# Patient Record
Sex: MALE | Race: ASIAN | NOT HISPANIC OR LATINO | Employment: FULL TIME | URBAN - METROPOLITAN AREA
[De-identification: names, ages, dates, MRNs, and addresses within clinical notes are randomized per-mention and may not be internally consistent; named-entity substitution may affect disease eponyms.]

---

## 2017-04-04 ENCOUNTER — ALLSCRIPTS OFFICE VISIT (OUTPATIENT)
Dept: OTHER | Facility: OTHER | Age: 52
End: 2017-04-04

## 2017-05-07 ENCOUNTER — ANESTHESIA EVENT (OUTPATIENT)
Dept: GASTROENTEROLOGY | Facility: HOSPITAL | Age: 52
End: 2017-05-07
Payer: COMMERCIAL

## 2017-05-08 ENCOUNTER — GENERIC CONVERSION - ENCOUNTER (OUTPATIENT)
Dept: OTHER | Facility: OTHER | Age: 52
End: 2017-05-08

## 2017-05-08 ENCOUNTER — HOSPITAL ENCOUNTER (OUTPATIENT)
Facility: HOSPITAL | Age: 52
Setting detail: OUTPATIENT SURGERY
Discharge: HOME | End: 2017-05-08
Attending: INTERNAL MEDICINE | Admitting: INTERNAL MEDICINE
Payer: COMMERCIAL

## 2017-05-08 ENCOUNTER — ANESTHESIA (OUTPATIENT)
Dept: GASTROENTEROLOGY | Facility: HOSPITAL | Age: 52
End: 2017-05-08
Payer: COMMERCIAL

## 2017-05-08 VITALS
WEIGHT: 158.73 LBS | RESPIRATION RATE: 16 BRPM | TEMPERATURE: 97.1 F | DIASTOLIC BLOOD PRESSURE: 69 MMHG | SYSTOLIC BLOOD PRESSURE: 103 MMHG | OXYGEN SATURATION: 100 % | HEIGHT: 65 IN | BODY MASS INDEX: 26.45 KG/M2 | HEART RATE: 70 BPM

## 2017-05-08 DIAGNOSIS — Z12.11 ENCOUNTER FOR SCREENING FOR MALIGNANT NEOPLASM OF COLON: ICD-10-CM

## 2017-05-08 DIAGNOSIS — K21.9 GERD (GASTROESOPHAGEAL REFLUX DISEASE): ICD-10-CM

## 2017-05-08 DIAGNOSIS — R10.13 EPIGASTRIC PAIN: ICD-10-CM

## 2017-05-08 PROCEDURE — 88305 TISSUE EXAM BY PATHOLOGIST: CPT | Performed by: INTERNAL MEDICINE

## 2017-05-08 PROCEDURE — 88342 IMHCHEM/IMCYTCHM 1ST ANTB: CPT | Performed by: INTERNAL MEDICINE

## 2017-05-08 RX ORDER — GLYCOPYRROLATE 0.2 MG/ML
INJECTION INTRAMUSCULAR; INTRAVENOUS AS NEEDED
Status: DISCONTINUED | OUTPATIENT
Start: 2017-05-08 | End: 2017-05-08 | Stop reason: SURG

## 2017-05-08 RX ORDER — LIDOCAINE HYDROCHLORIDE 10 MG/ML
INJECTION, SOLUTION INFILTRATION; PERINEURAL AS NEEDED
Status: DISCONTINUED | OUTPATIENT
Start: 2017-05-08 | End: 2017-05-08 | Stop reason: SURG

## 2017-05-08 RX ORDER — PANTOPRAZOLE SODIUM 40 MG/1
40 TABLET, DELAYED RELEASE ORAL
Status: DISCONTINUED | OUTPATIENT
Start: 2017-05-08 | End: 2017-05-08 | Stop reason: HOSPADM

## 2017-05-08 RX ORDER — SODIUM CHLORIDE, SODIUM LACTATE, POTASSIUM CHLORIDE, CALCIUM CHLORIDE 600; 310; 30; 20 MG/100ML; MG/100ML; MG/100ML; MG/100ML
100 INJECTION, SOLUTION INTRAVENOUS CONTINUOUS
Status: DISCONTINUED | OUTPATIENT
Start: 2017-05-08 | End: 2017-05-08 | Stop reason: HOSPADM

## 2017-05-08 RX ORDER — PROPOFOL 10 MG/ML
INJECTION, EMULSION INTRAVENOUS AS NEEDED
Status: DISCONTINUED | OUTPATIENT
Start: 2017-05-08 | End: 2017-05-08 | Stop reason: SURG

## 2017-05-08 RX ORDER — DEXLANSOPRAZOLE 60 MG/1
60 CAPSULE, DELAYED RELEASE ORAL DAILY
COMMUNITY
End: 2017-05-08 | Stop reason: HOSPADM

## 2017-05-08 RX ORDER — PROPOFOL 10 MG/ML
INJECTION, EMULSION INTRAVENOUS CONTINUOUS PRN
Status: DISCONTINUED | OUTPATIENT
Start: 2017-05-08 | End: 2017-05-08 | Stop reason: SURG

## 2017-05-08 RX ADMIN — PROPOFOL 100 MG: 10 INJECTION, EMULSION INTRAVENOUS at 08:53

## 2017-05-08 RX ADMIN — PROPOFOL 50 MG: 10 INJECTION, EMULSION INTRAVENOUS at 08:54

## 2017-05-08 RX ADMIN — PROPOFOL 120 MCG/KG/MIN: 10 INJECTION, EMULSION INTRAVENOUS at 08:55

## 2017-05-08 RX ADMIN — SODIUM CHLORIDE, SODIUM LACTATE, POTASSIUM CHLORIDE, AND CALCIUM CHLORIDE 100 ML/HR: .6; .31; .03; .02 INJECTION, SOLUTION INTRAVENOUS at 08:08

## 2017-05-08 RX ADMIN — GLYCOPYRROLATE 0.2 MG: 0.2 INJECTION INTRAMUSCULAR; INTRAVENOUS at 09:05

## 2017-05-08 RX ADMIN — LIDOCAINE HYDROCHLORIDE 25 MG: 10 INJECTION, SOLUTION INFILTRATION; PERINEURAL at 08:52

## 2017-05-08 RX ADMIN — PROPOFOL 20 MG: 10 INJECTION, EMULSION INTRAVENOUS at 09:02

## 2017-05-15 ENCOUNTER — GENERIC CONVERSION - ENCOUNTER (OUTPATIENT)
Dept: OTHER | Facility: OTHER | Age: 52
End: 2017-05-15

## 2018-01-11 NOTE — RESULT NOTES
Discussion/Summary    Gastric biopsies are benign  Patient to continue [facility]  Called for patient and left a message    PT IS AWARE  CS         Verified Results  (1) TISSUE EXAM [id number] 08:56AM Lliian Master     Test Name Result Flag Reference   LAB AP CASE REPORT (Report)     Surgical Pathology Report             Case: [id number]                   Authorizing Provider: Eric Lam MD     Collected:      05/08/2017 0856        Ordering Location:   [facility]    Received:      05/08/2017 [address] [place] [address] Endoscopy                               Pathologist:      Crystal Woody MD                                Specimen:  Stomach, Cold biopsy gastric body r/o H  pylori   LAB AP FINAL DIAGNOSIS (Report)     A  Stomach, Cold biopsy gastric body r/o H  Pylori biopsy:   -Benign gastric-body type mucosa with minimal chronic superficial inactive   gastritis   -No evidence of Paneth cell metaplasia or atrophic gastritis   -No evidence of dysplasia or malignancy   -No H Pylori organisms identified on immunohistochemical stained slide  Control reacted appropriately  Electronically signed by Crystal Woody MD on 5/10/2017 at 10:20 AM   LAB AP SURGICAL ADDITIONAL INFORMATION (Report)     These tests were developed and their performance characteristics   determined by [facility] or [facility]  They may not be cleared or approved by the [place]  Food and   Drug Administration  The FDA has determined that such clearance or   approval is not necessary  These tests are used for clinical purposes  They should not be regarded as investigational or for research  This   laboratory has been approved by [place], designated as a high-complexity   laboratory and is qualified to perform these tests  LAB AP GROSS DESCRIPTION (Report)     A   The specimen is received in formalin, labeled with the patient's name   and hospital number, and is designated gastric body biopsy  The   specimen consists of 5 tan-pink soft tissue fragments ranging from 0 2 cm   to 0 5 cm in greatest dimension  Entirely submitted  One cassette  Note: The estimated total formalin fixation time based upon information   provided by the submitting clinician and the standard processing schedule   is 17 5 hours    MAS   LAB AP CLINICAL INFORMATION R/O H PYLORI     R/O H PYLORI

## 2018-01-13 VITALS
SYSTOLIC BLOOD PRESSURE: 122 MMHG | OXYGEN SATURATION: 98 % | HEART RATE: 61 BPM | RESPIRATION RATE: 16 BRPM | HEIGHT: 65 IN | BODY MASS INDEX: 27.7 KG/M2 | DIASTOLIC BLOOD PRESSURE: 80 MMHG | WEIGHT: 166.25 LBS | TEMPERATURE: 97.3 F

## 2018-02-05 ENCOUNTER — TELEPHONE (OUTPATIENT)
Dept: GASTROENTEROLOGY | Facility: CLINIC | Age: 53
End: 2018-02-05

## 2018-02-05 NOTE — TELEPHONE ENCOUNTER
Elizabeth Roca from Dr Lyla Lynn office is requesting that EGD/Colon results from 2017 are faxed to:   [phone] attn Dr José Miguel Meek

## 2018-02-12 NOTE — TELEPHONE ENCOUNTER
LVM FOR PT TO CONTACT OFFICE WE NEED A SIGNED RELEASE OF HEALTH INFORMATION SO WE CAN FORWARD RECORDS TO DR Shawn Ferrara